# Patient Record
Sex: FEMALE | Race: ASIAN | NOT HISPANIC OR LATINO | Employment: FULL TIME | URBAN - METROPOLITAN AREA
[De-identification: names, ages, dates, MRNs, and addresses within clinical notes are randomized per-mention and may not be internally consistent; named-entity substitution may affect disease eponyms.]

---

## 2017-03-25 ENCOUNTER — HOSPITAL ENCOUNTER (EMERGENCY)
Facility: MEDICAL CENTER | Age: 62
End: 2017-03-25
Attending: EMERGENCY MEDICINE
Payer: COMMERCIAL

## 2017-03-25 ENCOUNTER — APPOINTMENT (OUTPATIENT)
Dept: RADIOLOGY | Facility: MEDICAL CENTER | Age: 62
End: 2017-03-25
Attending: EMERGENCY MEDICINE
Payer: COMMERCIAL

## 2017-03-25 VITALS
SYSTOLIC BLOOD PRESSURE: 143 MMHG | BODY MASS INDEX: 26.5 KG/M2 | HEIGHT: 60 IN | OXYGEN SATURATION: 96 % | HEART RATE: 77 BPM | WEIGHT: 135 LBS | RESPIRATION RATE: 16 BRPM | TEMPERATURE: 97.5 F | DIASTOLIC BLOOD PRESSURE: 70 MMHG

## 2017-03-25 DIAGNOSIS — R42 VERTIGO: ICD-10-CM

## 2017-03-25 LAB — GLUCOSE BLD-MCNC: 167 MG/DL (ref 65–99)

## 2017-03-25 PROCEDURE — 96375 TX/PRO/DX INJ NEW DRUG ADDON: CPT

## 2017-03-25 PROCEDURE — 82962 GLUCOSE BLOOD TEST: CPT

## 2017-03-25 PROCEDURE — 96374 THER/PROPH/DIAG INJ IV PUSH: CPT

## 2017-03-25 PROCEDURE — 700111 HCHG RX REV CODE 636 W/ 250 OVERRIDE (IP): Performed by: EMERGENCY MEDICINE

## 2017-03-25 PROCEDURE — 70450 CT HEAD/BRAIN W/O DYE: CPT

## 2017-03-25 PROCEDURE — 99284 EMERGENCY DEPT VISIT MOD MDM: CPT

## 2017-03-25 RX ORDER — MECLIZINE HYDROCHLORIDE 25 MG/1
25 TABLET ORAL 3 TIMES DAILY PRN
Qty: 30 TAB | Refills: 0 | Status: SHIPPED | OUTPATIENT
Start: 2017-03-25

## 2017-03-25 RX ORDER — ONDANSETRON 2 MG/ML
4 INJECTION INTRAMUSCULAR; INTRAVENOUS ONCE
Status: COMPLETED | OUTPATIENT
Start: 2017-03-25 | End: 2017-03-25

## 2017-03-25 RX ORDER — LORAZEPAM 2 MG/ML
1 INJECTION INTRAMUSCULAR ONCE
Status: COMPLETED | OUTPATIENT
Start: 2017-03-25 | End: 2017-03-25

## 2017-03-25 RX ADMIN — LORAZEPAM 1 MG: 2 INJECTION INTRAMUSCULAR at 03:46

## 2017-03-25 RX ADMIN — ONDANSETRON 4 MG: 2 INJECTION, SOLUTION INTRAMUSCULAR; INTRAVENOUS at 03:46

## 2017-03-25 NOTE — ED AVS SNAPSHOT
TripTouch Access Code: [email or web address]  Expires: 4/24/2017  4:43 AM    Your email address is not on file at [facility].  Email Addresses are required for you to sign up for TripTouch, please contact [phone] to verify your personal information and to provide your email address prior to attempting to register for TripTouch.    Analisa Barnes  [address]  [place] [address]    GBookingt  A secure, online tool to manage your health information     [facility]’s TripTouch® is a secure, online tool that connects you to your personalized health information from the privacy of your home -- day or night - making it very easy for you to manage your healthcare. Once the activation process is completed, you can even access your medical information using the TripTouch josie, which is available for free in the Apple Josie store or Google Play store.     To learn more about TripTouch, visit [email or web address]    There are two levels of access available (as shown below):   My Chart Features  [facility] Primary Care Doctor [facility]  Specialists [facility]  Urgent  Care Non-[facility] Primary Care Doctor   Email your healthcare team securely and privately 24/7 X X X    Manage appointments: schedule your next appointment; view details of past/upcoming appointments X      Request prescription refills. X      View recent personal medical records, including lab and immunizations X X X X   View health record, including health history, allergies, medications X X X X   Read reports about your outpatient visits, procedures, consult and ER notes X X X X   See your discharge summary, which is a recap of your hospital and/or ER visit that includes your diagnosis, lab results, and care plan X X  X     How to register for GBookingt:  Once your e-mail address has been verified, follow the following steps to sign up for GBookingt.     1. Go to  [email or web address]  2. Click on the Sign Up Now box, which takes you to the New Member Sign Up page. You will  need to provide the following information:  a. Enter your AlertEnterprise Access Code exactly as it appears at the top of this page. (You will not need to use this code after you’ve completed the sign-up process. If you do not sign up before the expiration date, you must request a new code.)   b. Enter your date of birth.   c. Enter your home email address.   d. Click Submit, and follow the next screen’s instructions.  3. Create a GenCell Biosystemst ID. This will be your AlertEnterprise login ID and cannot be changed, so think of one that is secure and easy to remember.  4. Create a AlertEnterprise password. You can change your password at any time.  5. Enter your Password Reset Question and Answer. This can be used at a later time if you forget your password.   6. Enter your e-mail address. This allows you to receive e-mail notifications when new information is available in AlertEnterprise.  7. Click Sign Up. You can now view your health information.    For assistance activating your AlertEnterprise account, call [phone]

## 2017-03-25 NOTE — ED NOTES
Patient was educated on discharge instructions.  Patient was informed about diagnosis, symptom management, risks, and home care instructions.  Patient verbalized understanding and signed discharge instructions.  Patient has personal belongings and PIV removed, tip intact.  Called brother for ride.

## 2017-03-25 NOTE — ED AVS SNAPSHOT
Home Care Instructions                                                                                                                Analisa Barnes   MRN: [id number]    Department:  [facility], Emergency Dept   Date of Visit:  3/25/2017            [facility], Emergency Dept    [address]    [facility] [address]    Phone:  [phone]      You were seen by     Cleveland Tomas M.D.      Your Diagnosis Was     Vertigo     R42       These are the medications you received during your hospitalization from 03/25/2017 0244 to 03/25/2017 0443     Date/Time Order Dose Route Action    03/25/2017 0346 lorazepam (ATIVAN) injection 1 mg 1 mg Intravenous Given    03/25/2017 0346 ondansetron (ZOFRAN) syringe/vial injection 4 mg 4 mg Intravenous Given      Follow-up Information     1. Follow up with Jhon Ibanez D.O..    Specialty:  Family Medicine    Contact information    [address] [facility] [facility]  [address]  [facility] [address]  [phone]        Medication Information     Review all of your home medications and newly ordered medications with your primary doctor and/or pharmacist as soon as possible. Follow medication instructions as directed by your doctor and/or pharmacist.     Please keep your complete medication list with you and share with your physician. Update the information when medications are discontinued, doses are changed, or new medications (including over-the-counter products) are added; and carry medication information at all times in the event of emergency situations.               Medication List      START taking these medications        Instructions    Morning Afternoon Evening Bedtime    meclizine 25 MG Tabs   Commonly known as:  ANTIVERT        Take 1 Tab by mouth 3 times a day as needed.   Dose:  25 mg                          ASK your doctor about these medications        Instructions    Morning Afternoon Evening Bedtime    ascorbic acid 500 MG Tabs   Commonly known  as:  ascorbic acid        Take 1,000 mg by mouth every day.   Dose:  1000 mg                        aspirin 81 MG Chew chewable tablet   Commonly known as:  ASA        Take 81 mg by mouth every day.   Dose:  81 mg                        cyanocobalamin 500 MCG Tabs   Commonly known as:  VITAMIN B-12        Take 1,000 mcg by mouth every day.   Dose:  1000 mcg                        folic acid 800 MCG tablet   Commonly known as:  FOLVITE        Take  by mouth every day.                        Garlic 100 MG Tabs        Take 400 Each by mouth every day. 400 IU daily   Dose:  400 Each                        telmisartan 40 MG Tabs   Commonly known as:  MICARDIS        Take 40 mg by mouth every day. Indications: High Blood Pressure   Dose:  40 mg                        Vitamin D 2000 UNITS Caps        Take  by mouth every day.                        vitamin e 400 UNIT Caps   Commonly known as:  VITAMIN E        Take 400 Units by mouth every day.   Dose:  400 Units                             Where to Get Your Medications      You can get these medications from any pharmacy     Bring a paper prescription for each of these medications    - meclizine 25 MG Tabs            Procedures and tests performed during your visit     ACCU-CHEK GLUCOSE    CT-HEAD W/O    SALINE LOCK        Discharge Instructions       Benign Positional Vertigo  Vertigo means you feel like you or your surroundings are moving when they are not. Benign positional vertigo is the most common form of vertigo. Benign means that the cause of your condition is not serious. Benign positional vertigo is more common in older adults.  CAUSES   Benign positional vertigo is the result of an upset in the labyrinth system. This is an area in the middle ear that helps control your balance. This may be caused by a viral infection, head injury, or repetitive motion. However, often no specific cause is found.  SYMPTOMS   Symptoms of benign positional vertigo occur when you  move your head or eyes in different directions. Some of the symptoms may include:  · Loss of balance and falls.  · Vomiting.  · Blurred vision.  · Dizziness.  · Nausea.  · Involuntary eye movements (nystagmus).  DIAGNOSIS   Benign positional vertigo is usually diagnosed by physical exam. If the specific cause of your benign positional vertigo is unknown, your caregiver may perform imaging tests, such as magnetic resonance imaging (MRI) or computed tomography (CT).  TREATMENT   Your caregiver may recommend movements or procedures to correct the benign positional vertigo. Medicines such as meclizine, benzodiazepines, and medicines for nausea may be used to treat your symptoms. In rare cases, if your symptoms are caused by certain conditions that affect the inner ear, you may need surgery.  HOME CARE INSTRUCTIONS   · Follow your caregiver's instructions.  · Move slowly. Do not make sudden body or head movements.  · Avoid driving.  · Avoid operating heavy machinery.  · Avoid performing any tasks that would be dangerous to you or others during a vertigo episode.  · Drink enough fluids to keep your urine clear or pale yellow.  SEEK IMMEDIATE MEDICAL CARE IF:   · You develop problems with walking, weakness, numbness, or using your arms, hands, or legs.  · You have difficulty speaking.  · You develop severe headaches.  · Your nausea or vomiting continues or gets worse.  · You develop visual changes.  · Your family or friends notice any behavioral changes.  · Your condition gets worse.  · You have a fever.  · You develop a stiff neck or sensitivity to light.  MAKE SURE YOU:   · Understand these instructions.  · Will watch your condition.  · Will get help right away if you are not doing well or get worse.     This information is not intended to replace advice given to you by your health care provider. Make sure you discuss any questions you have with your health care provider.     Document Released: 09/25/2007 Document  Revised: 03/11/2013 Document Reviewed: 04/11/2016  Elsevier Interactive Patient Education ©2016 [employer]            Patient Information     Patient Information    Following emergency treatment: all patient requiring follow-up care must return either to a private physician or a clinic if your condition worsens before you are able to obtain further medical attention, please return to the emergency room.     Billing Information    At [facility], we work to make the billing process streamlined for our patients.  Our Representatives are here to answer any questions you may have regarding your hospital bill.  If you have insurance coverage and have supplied your insurance information to us, we will submit a claim to your insurer on your behalf.  Should you have any questions regarding your bill, we can be reached online or by phone as follows:  Online: You are able pay your bills online or live chat with our representatives about any billing questions you may have. We are here to help Monday - Friday from 8:00am to 7:30pm and 9:00am - 12:00pm on Saturdays.  Please visit [email or web address]  for more information.   Phone:  [phone] or [phone]    Please note that your emergency physician, surgeon, pathologist, radiologist, anesthesiologist, and other specialists are not employed by [facility] and will therefore bill separately for their services.  Please contact them directly for any questions concerning their bills at the numbers below:     Emergency Physician Services:  [phone]  [facility]:  [phone]  [facility]:  [phone]  [facility]:  [phone]    1. Your final bill may vary from the amount quoted upon discharge if all procedures are not complete at that time, or if your doctor has additional procedures of which we are not aware. You will receive an additional bill if you return to the Emergency  Department at [facility] for suture removal regardless of the facility of which the sutures were placed.     2. Please arrange for settlement of this account at the emergency registration.    3. All self-pay accounts are due in full at the time of treatment.  If you are unable to meet this obligation then payment is expected within 4-5 days.     4. If you have had radiology studies (CT, X-ray, Ultrasound, MRI), you have received a preliminary result during your emergency department visit. Please contact the radiology department [phone] to receive a copy of your final result. Please discuss the Final result with your primary physician or with the follow up physician provided.     Crisis Hotline:  [place] Crisis Hotline:  3-258-ADGDBOX or 1-239.493.5508  [place] Crisis Hotline:    1-736.593.8044 or 091-177-3958         ED Discharge Follow Up Questions    1. In order to provide you with very good care, we would like to follow up with a phone call in the next few days.  May we have your permission to contact you?     YES /  NO    2. What is the best phone number to call you? (       )_____-__________    3. What is the best time to call you?      Morning  /  Afternoon  /  Evening                   Patient Signature:  ____________________________________________________________    Date:  ____________________________________________________________

## 2017-03-25 NOTE — ED NOTES
Pt wheeled to [facility],  Pt c/o of nausea, vomiting, and dizziness.  Pt states she feels like she is spinning.  Patient placed on monitor and gown.

## 2017-03-25 NOTE — ED NOTES
Pt ambulated out with brother with steady gait.  Pt refused wheelchair.  Brother given discharge instructions and prescription x1.

## 2017-03-25 NOTE — ED PROVIDER NOTES
"ED Provider Note    Scribed for Cleveland Tomas M.D. by Josué Arzate. 3/25/2017, 3:23 AM.    Primary care provider: Jhon Ibanez D.O.  Means of arrival: Walk in  History obtained from: Patient  History limited by: None    CHIEF COMPLAINT  Chief Complaint   Patient presents with   • Dizziness     since aprox 0200   • N/V     since aprox 0200.    • Head Ache     since aprox 0200. 5/10 \"dull, all over\" pain       HPI  Analisa Barnes is a 62 y.o. female who presents to the Emergency Department complaining of sudden dizziness and vertigo onset approximately 2 hours ago. Patient states she was on lunch today when she suddenly felt clammy and diaphoretic. When the vertigo appears, she has the urge to vomit. She has an associated dull headache and nausea when opening her eyes. She states still feeling nauseous and a sense of vertigo with her eyes closed. She denies neck pain, chest pain, shortness of breath, and abdominal pain.      REVIEW OF SYSTEMS  See HPI,  Negative for neck pain, chest pain, shortness of breath, and abdominal pain. Remainder of ROS negative.     PAST MEDICAL HISTORY   has a past medical history of Diabetes (2001); Heart burn; Indigestion; Snoring; CATARACT; Psychiatric problem; and Hypertension.    SURGICAL HISTORY   has past surgical history that includes ercp (9/2010, 9/2008); cataract extraction with iol (2003, 2006); gastroscopy with biopsy (9/4/2012); egd w/endoscopic ultrasound (9/4/2012); ercp w/removal calculus (9/4/2012); and ercp in or (11/22/2015).    SOCIAL HISTORY  Social History   Substance Use Topics   • Smoking status: Never Smoker    • Alcohol Use: No      History   Drug Use No       FAMILY HISTORY  Family History   Problem Relation Age of Onset   • Diabetes     • Hypertension         CURRENT MEDICATIONS  Reviewed.  See Encounter Summary.     ALLERGIES  Allergies   Allergen Reactions   • Sulfa Drugs      Abdominal pain   • Tetracycline Itching   • Vicodin " [Hydrocodone-Acetaminophen]      Severe abdominal pain       PHYSICAL EXAM  VITAL SIGNS: /70 mmHg  Pulse 78  Temp(Src) 36.4 °C (97.5 °F)  Resp 16  Ht 1.524 m (5')  Wt 61.236 kg (135 lb)  BMI 26.37 kg/m2  SpO2 98%  Constitutional: Awake, alert in no apparent distress.  HENT: Normocephalic, Bilateral external ears normal. Nose normal. Bilateral tympanic membranes normal. No mastoid tenderness.  Eyes: Conjunctiva normal, non-icteric, EOMI.    Thorax & Lungs: Easy unlabored respirations, Clear to ascultation bilaterally.  Cardiovascular: Regular rate, Regular rhythm, No murmurs, rubs or gallops.  Abdomen:  Soft, nontender, no masses   Skin: Visualized skin is  Dry, No erythema, No rash.   Extremities:   No cyanosis, clubbing or edema.  Neuro: CN II-XII intact. Sensation intact all extremities. 5/5 strength throughout. DTR 2+ at bilateral bicep, BR, patella. Negative rhonberg, no drift, no dysmetria. Heel to shin and rapid alternating movents executed normally. Normal speech, stance. Unable to assess gait secondary to vertigo.  Psychiatric: Normal affect, Normal mood      DIAGNOSTIC STUDIES / PROCEDURES       RADIOLOGY  CT-HEAD W/O   Final Result         1.  No acute intracranial abnormality is identified, there are changes of small vessel ischemic disease with mild atrophy noted.   2.  Atherosclerosis.        The radiologist's interpretation of all radiological studies have been reviewed by me.    COURSE & MEDICAL DECISION MAKING  Pertinent Labs & Imaging studies reviewed. (See chart for details)        3:23 AM - Patient seen and examined at bedside. Patient will be treated with Ativan 1 mg, Zofran 4 mg. Ordered CT head, accu-check glucose to evaluate her symptoms.     4:36 AM- patient resting comfortably, she has significant improvement. She is able to open her eyes and look around the room. She still does have some mild vertigo.    Decision Making:  This is a 62 y.o. year old female who presents with  sudden onset of vertigo. She does not have any neck pain, she does not have a severe headache, my suspicion of subarachnoid hemorrhage or a sudden spontaneous intracranial hemorrhage was extremely low. CT of the head is unremarkable. I do not suspect carotid artery dissection.    The patient's symptoms improved with Ativan. I do not suspect cerebellar CVA at this time. She is not hypertensive, she does not have any cerebellar findings.    The patient will be discharged the prescribed for meclizine. I did discuss Epley maneuvers. She is return for any severe intractable headache, neck pain, numbness, weakness, inability to walk or any other concern.    The patient will be discharged to home in stable condition.    FINAL IMPRESSION  1. Vertigo          Josué JOHNSON (Scribe), am scribing for, and in the presence of, Cleveland Tomas M.D..    Electronically signed by: Josué Arzate (Scribe), 3/25/2017    Cleveland JOHNSON M.D. personally performed the services described in this documentation, as scribed by Josué Arzate in my presence, and it is both accurate and complete.    The note accurately reflects work and decisions made by me.  Cleveland Tomas  3/25/2017  4:38 AM

## 2017-03-25 NOTE — DISCHARGE INSTRUCTIONS
Benign Positional Vertigo  Vertigo means you feel like you or your surroundings are moving when they are not. Benign positional vertigo is the most common form of vertigo. Benign means that the cause of your condition is not serious. Benign positional vertigo is more common in older adults.  CAUSES   Benign positional vertigo is the result of an upset in the labyrinth system. This is an area in the middle ear that helps control your balance. This may be caused by a viral infection, head injury, or repetitive motion. However, often no specific cause is found.  SYMPTOMS   Symptoms of benign positional vertigo occur when you move your head or eyes in different directions. Some of the symptoms may include:  · Loss of balance and falls.  · Vomiting.  · Blurred vision.  · Dizziness.  · Nausea.  · Involuntary eye movements (nystagmus).  DIAGNOSIS   Benign positional vertigo is usually diagnosed by physical exam. If the specific cause of your benign positional vertigo is unknown, your caregiver may perform imaging tests, such as magnetic resonance imaging (MRI) or computed tomography (CT).  TREATMENT   Your caregiver may recommend movements or procedures to correct the benign positional vertigo. Medicines such as meclizine, benzodiazepines, and medicines for nausea may be used to treat your symptoms. In rare cases, if your symptoms are caused by certain conditions that affect the inner ear, you may need surgery.  HOME CARE INSTRUCTIONS   · Follow your caregiver's instructions.  · Move slowly. Do not make sudden body or head movements.  · Avoid driving.  · Avoid operating heavy machinery.  · Avoid performing any tasks that would be dangerous to you or others during a vertigo episode.  · Drink enough fluids to keep your urine clear or pale yellow.  SEEK IMMEDIATE MEDICAL CARE IF:   · You develop problems with walking, weakness, numbness, or using your arms, hands, or legs.  · You have difficulty speaking.  · You develop  severe headaches.  · Your nausea or vomiting continues or gets worse.  · You develop visual changes.  · Your family or friends notice any behavioral changes.  · Your condition gets worse.  · You have a fever.  · You develop a stiff neck or sensitivity to light.  MAKE SURE YOU:   · Understand these instructions.  · Will watch your condition.  · Will get help right away if you are not doing well or get worse.     This information is not intended to replace advice given to you by your health care provider. Make sure you discuss any questions you have with your health care provider.     Document Released: 09/25/2007 Document Revised: 03/11/2013 Document Reviewed: 04/11/2016  [employer] Interactive Patient Education ©2016 Elsevier Inc.

## 2017-03-25 NOTE — ED AVS SNAPSHOT
3/25/2017          Analisa Barnes   [address]  [place] [address]    Dear Analisa Lujan:    [facility] wants to ensure your discharge home is safe and you or your loved ones have had all your questions answered regarding your care after you leave the hospital.    You may receive a telephone call within two days of your discharge.  This call is to make certain you understand your discharge instructions as well as ensure we provided you with the best care possible during your stay with us.     The call will only last approximately 3-5 minutes and will be done by a nurse.    Once again, we want to ensure your discharge home is safe and that you have a clear understanding of any next steps in your care.  If you have any questions or concerns, please do not hesitate to contact us, we are here for you.  Thank you for choosing [facility] for your healthcare needs.    Sincerely,    Cleveland Monzon    [facility]

## 2017-03-25 NOTE — ED NOTES
"Analisa Le Cameron  62 y.o. female  Chief Complaint   Patient presents with   • Dizziness     since aprox 0200   • N/V     since aprox 0200.    • Head Ache     since aprox 0200. 5/10 \"dull, all over\" pain     Pt to triage via wheelchair for above complaint. Per pt, pt became dizzy while [place] [employer]. Pt states, \"I can't open my eyes, I get dizzy.\" Pt is A&OX4, speaking in full sentences, follows commands and responds appropriately to questions.   FSBS 167   Pt placed in lobby. Pt educated on triage process. Pt encouraged to alert staff for any changes.    "

## 2021-03-03 DIAGNOSIS — Z23 NEED FOR VACCINATION: ICD-10-CM

## 2023-01-03 ENCOUNTER — OFFICE VISIT (OUTPATIENT)
Dept: OCCUPATIONAL MEDICINE | Facility: CLINIC | Age: 68
End: 2023-01-03

## 2023-01-03 ENCOUNTER — APPOINTMENT (OUTPATIENT)
Dept: RADIOLOGY | Facility: IMAGING CENTER | Age: 68
End: 2023-01-03
Attending: NURSE PRACTITIONER

## 2023-01-03 VITALS
BODY MASS INDEX: 25.52 KG/M2 | DIASTOLIC BLOOD PRESSURE: 80 MMHG | WEIGHT: 130 LBS | OXYGEN SATURATION: 97 % | HEIGHT: 60 IN | HEART RATE: 80 BPM | RESPIRATION RATE: 18 BRPM | SYSTOLIC BLOOD PRESSURE: 144 MMHG

## 2023-01-03 DIAGNOSIS — R76.12 POSITIVE QUANTIFERON-TB GOLD TEST: ICD-10-CM

## 2023-01-03 DIAGNOSIS — Z02.1 PRE-EMPLOYMENT HEALTH SCREENING EXAMINATION: ICD-10-CM

## 2023-01-03 PROCEDURE — 8915 PR COMPREHENSIVE PHYSICAL: Performed by: NURSE PRACTITIONER

## 2023-01-03 PROCEDURE — 71045 X-RAY EXAM CHEST 1 VIEW: CPT | Mod: TC | Performed by: NURSE PRACTITIONER
